# Patient Record
Sex: FEMALE | Race: WHITE | NOT HISPANIC OR LATINO | Employment: OTHER | URBAN - METROPOLITAN AREA
[De-identification: names, ages, dates, MRNs, and addresses within clinical notes are randomized per-mention and may not be internally consistent; named-entity substitution may affect disease eponyms.]

---

## 2020-06-16 NOTE — PATIENT DISCUSSION
New Prescription: Maxitrol (neomycin-polymyxin-dexameth): drops,suspension: 3.5-10,000-0.1 mg/mL-unit/mL-% 1 drop three times a day as directed into right eye 06-

## 2022-08-08 ENCOUNTER — ESTABLISHED PATIENT (OUTPATIENT)
Facility: LOCATION | Age: 27
End: 2022-08-08

## 2022-08-08 DIAGNOSIS — H52.13: ICD-10-CM

## 2022-08-08 PROCEDURE — 92014 COMPRE OPH EXAM EST PT 1/>: CPT

## 2022-08-08 PROCEDURE — 92310C CONTACT LENS 75

## 2022-08-08 PROCEDURE — 92015 DETERMINE REFRACTIVE STATE: CPT

## 2022-08-08 ASSESSMENT — VISUAL ACUITY
OU_CC: 20/20
OU_SC: 20/50
OS_CC: 20/20
OD_CC: 20/20-2

## 2022-08-08 ASSESSMENT — KERATOMETRY
OS_AXISANGLE2_DEGREES: 87
OS_AXISANGLE_DEGREES: 177
OS_K2POWER_DIOPTERS: 43.75
OD_K1POWER_DIOPTERS: 42.25
OS_K1POWER_DIOPTERS: 42.75
OD_AXISANGLE_DEGREES: 3
OD_K2POWER_DIOPTERS: 43.50
OD_AXISANGLE2_DEGREES: 93

## 2022-08-08 ASSESSMENT — TONOMETRY
OD_IOP_MMHG: 14
OS_IOP_MMHG: 14

## 2023-08-08 ASSESSMENT — KERATOMETRY
OD_AXISANGLE2_DEGREES: 93
OD_AXISANGLE_DEGREES: 3
OS_K1POWER_DIOPTERS: 42.75
OS_K2POWER_DIOPTERS: 43.75
OS_AXISANGLE2_DEGREES: 87
OD_K2POWER_DIOPTERS: 43.50
OD_K1POWER_DIOPTERS: 42.25
OS_AXISANGLE_DEGREES: 177

## 2023-08-09 ENCOUNTER — PREPPED CHART (OUTPATIENT)
Facility: LOCATION | Age: 28
End: 2023-08-09

## 2023-08-22 ASSESSMENT — KERATOMETRY
OD_AXISANGLE_DEGREES: 3
OS_AXISANGLE2_DEGREES: 87
OD_K2POWER_DIOPTERS: 43.50
OD_AXISANGLE2_DEGREES: 93
OS_AXISANGLE_DEGREES: 177
OS_K1POWER_DIOPTERS: 42.75
OD_K1POWER_DIOPTERS: 42.25
OS_K2POWER_DIOPTERS: 43.75

## 2023-08-23 ENCOUNTER — ESTABLISHED PATIENT (OUTPATIENT)
Facility: LOCATION | Age: 28
End: 2023-08-23

## 2023-08-23 DIAGNOSIS — H52.13: ICD-10-CM

## 2023-08-23 PROCEDURE — 92015 DETERMINE REFRACTIVE STATE: CPT

## 2023-08-23 PROCEDURE — 92310C CONTACT LENS 75

## 2023-08-23 PROCEDURE — 92014 COMPRE OPH EXAM EST PT 1/>: CPT

## 2023-08-23 ASSESSMENT — VISUAL ACUITY
OS_CC: 20/20
OD_CC: 20/20
OU_CC: 20/20

## 2023-08-23 ASSESSMENT — TONOMETRY
OS_IOP_MMHG: 10
OD_IOP_MMHG: 13